# Patient Record
Sex: MALE | Race: WHITE | NOT HISPANIC OR LATINO | Employment: FULL TIME | URBAN - METROPOLITAN AREA
[De-identification: names, ages, dates, MRNs, and addresses within clinical notes are randomized per-mention and may not be internally consistent; named-entity substitution may affect disease eponyms.]

---

## 2017-02-20 ENCOUNTER — HOSPITAL ENCOUNTER (OUTPATIENT)
Dept: ONCOLOGY | Facility: CLINIC | Age: 57
Discharge: HOME | End: 2017-02-20
Attending: INTERNAL MEDICINE | Admitting: INTERNAL MEDICINE

## 2019-03-04 ENCOUNTER — HOSPITAL ENCOUNTER (OUTPATIENT)
Dept: RESPIRATORY THERAPY | Facility: HOSPITAL | Age: 59
Discharge: HOME | End: 2019-03-04
Attending: NURSE PRACTITIONER | Admitting: NURSE PRACTITIONER

## 2020-01-31 ENCOUNTER — APPOINTMENT (OUTPATIENT)
Dept: CT IMAGING | Facility: HOSPITAL | Age: 60
End: 2020-01-31

## 2020-01-31 ENCOUNTER — APPOINTMENT (OUTPATIENT)
Dept: MRI IMAGING | Facility: HOSPITAL | Age: 60
End: 2020-01-31

## 2020-01-31 ENCOUNTER — APPOINTMENT (OUTPATIENT)
Dept: GENERAL RADIOLOGY | Facility: HOSPITAL | Age: 60
End: 2020-01-31

## 2020-01-31 ENCOUNTER — HOSPITAL ENCOUNTER (OUTPATIENT)
Facility: HOSPITAL | Age: 60
Setting detail: OBSERVATION
Discharge: HOME | End: 2020-02-02
Attending: EMERGENCY MEDICINE | Admitting: INTERNAL MEDICINE

## 2020-01-31 DIAGNOSIS — G52.7 MULTIPLE CRANIAL NERVE PALSIES: Primary | ICD-10-CM

## 2020-01-31 LAB
ANION GAP SERPL CALCULATED.3IONS-SCNC: 14 MMOL/L (ref 5–15)
BASOPHILS # BLD AUTO: 0.1 10*3/MM3 (ref 0–0.2)
BASOPHILS NFR BLD AUTO: 0.9 % (ref 0–1.5)
BUN BLD-MCNC: 11 MG/DL (ref 6–20)
BUN/CREAT SERPL: 12.1 (ref 7–25)
CALCIUM SPEC-SCNC: 9.2 MG/DL (ref 8.6–10.5)
CHLORIDE SERPL-SCNC: 99 MMOL/L (ref 98–107)
CO2 SERPL-SCNC: 24 MMOL/L (ref 22–29)
CREAT BLD-MCNC: 0.91 MG/DL (ref 0.76–1.27)
DEPRECATED RDW RBC AUTO: 43.8 FL (ref 37–54)
EOSINOPHIL # BLD AUTO: 0 10*3/MM3 (ref 0–0.4)
EOSINOPHIL NFR BLD AUTO: 0.1 % (ref 0.3–6.2)
ERYTHROCYTE [DISTWIDTH] IN BLOOD BY AUTOMATED COUNT: 13.8 % (ref 12.3–15.4)
ERYTHROCYTE [SEDIMENTATION RATE] IN BLOOD: 27 MM/HR (ref 0–20)
GFR SERPL CREATININE-BSD FRML MDRD: 86 ML/MIN/1.73
GLUCOSE BLD-MCNC: 102 MG/DL (ref 65–99)
GLUCOSE BLDC GLUCOMTR-MCNC: 70 MG/DL (ref 70–105)
GLUCOSE BLDC GLUCOMTR-MCNC: 85 MG/DL (ref 70–105)
HCT VFR BLD AUTO: 43.5 % (ref 37.5–51)
HGB BLD-MCNC: 15.6 G/DL (ref 13–17.7)
HIV1+2 AB SER QL: NORMAL
LYMPHOCYTES # BLD AUTO: 1.1 10*3/MM3 (ref 0.7–3.1)
LYMPHOCYTES NFR BLD AUTO: 15.7 % (ref 19.6–45.3)
MCH RBC QN AUTO: 32.2 PG (ref 26.6–33)
MCHC RBC AUTO-ENTMCNC: 35.8 G/DL (ref 31.5–35.7)
MCV RBC AUTO: 90 FL (ref 79–97)
MONOCYTES # BLD AUTO: 0.7 10*3/MM3 (ref 0.1–0.9)
MONOCYTES NFR BLD AUTO: 10.1 % (ref 5–12)
NEUTROPHILS # BLD AUTO: 5.1 10*3/MM3 (ref 1.7–7)
NEUTROPHILS NFR BLD AUTO: 73.2 % (ref 42.7–76)
NRBC BLD AUTO-RTO: 0 /100 WBC (ref 0–0.2)
PLATELET # BLD AUTO: 203 10*3/MM3 (ref 140–450)
PMV BLD AUTO: 8.7 FL (ref 6–12)
POTASSIUM BLD-SCNC: 4.8 MMOL/L (ref 3.5–5.2)
RBC # BLD AUTO: 4.83 10*6/MM3 (ref 4.14–5.8)
SODIUM BLD-SCNC: 137 MMOL/L (ref 136–145)
TSH SERPL DL<=0.05 MIU/L-ACNC: 2.61 UIU/ML (ref 0.27–4.2)
WBC NRBC COR # BLD: 7 10*3/MM3 (ref 3.4–10.8)

## 2020-01-31 PROCEDURE — 83519 RIA NONANTIBODY: CPT | Performed by: EMERGENCY MEDICINE

## 2020-01-31 PROCEDURE — G0378 HOSPITAL OBSERVATION PER HR: HCPCS

## 2020-01-31 PROCEDURE — 86757 RICKETTSIA ANTIBODY: CPT | Performed by: INTERNAL MEDICINE

## 2020-01-31 PROCEDURE — 99220 PR INITIAL OBSERVATION CARE/DAY 70 MINUTES: CPT | Performed by: INTERNAL MEDICINE

## 2020-01-31 PROCEDURE — G0432 EIA HIV-1/HIV-2 SCREEN: HCPCS | Performed by: INTERNAL MEDICINE

## 2020-01-31 PROCEDURE — 99214 OFFICE O/P EST MOD 30 MIN: CPT | Performed by: PSYCHIATRY & NEUROLOGY

## 2020-01-31 PROCEDURE — 86666 EHRLICHIA ANTIBODY: CPT | Performed by: INTERNAL MEDICINE

## 2020-01-31 PROCEDURE — 85652 RBC SED RATE AUTOMATED: CPT | Performed by: EMERGENCY MEDICINE

## 2020-01-31 PROCEDURE — 85025 COMPLETE CBC W/AUTO DIFF WBC: CPT | Performed by: EMERGENCY MEDICINE

## 2020-01-31 PROCEDURE — 0099U HC BIOFIRE FILMARRAY RESP PANEL 1: CPT | Performed by: INTERNAL MEDICINE

## 2020-01-31 PROCEDURE — 96360 HYDRATION IV INFUSION INIT: CPT

## 2020-01-31 PROCEDURE — 87040 BLOOD CULTURE FOR BACTERIA: CPT | Performed by: INTERNAL MEDICINE

## 2020-01-31 PROCEDURE — 86622 BRUCELLA ANTIBODY: CPT | Performed by: INTERNAL MEDICINE

## 2020-01-31 PROCEDURE — 70551 MRI BRAIN STEM W/O DYE: CPT

## 2020-01-31 PROCEDURE — 86618 LYME DISEASE ANTIBODY: CPT | Performed by: INTERNAL MEDICINE

## 2020-01-31 PROCEDURE — 86592 SYPHILIS TEST NON-TREP QUAL: CPT | Performed by: INTERNAL MEDICINE

## 2020-01-31 PROCEDURE — 71046 X-RAY EXAM CHEST 2 VIEWS: CPT

## 2020-01-31 PROCEDURE — 82607 VITAMIN B-12: CPT | Performed by: INTERNAL MEDICINE

## 2020-01-31 PROCEDURE — 80048 BASIC METABOLIC PNL TOTAL CA: CPT | Performed by: EMERGENCY MEDICINE

## 2020-01-31 PROCEDURE — 0 IOPAMIDOL PER 1 ML: Performed by: EMERGENCY MEDICINE

## 2020-01-31 PROCEDURE — 99283 EMERGENCY DEPT VISIT LOW MDM: CPT

## 2020-01-31 PROCEDURE — 84443 ASSAY THYROID STIM HORMONE: CPT | Performed by: EMERGENCY MEDICINE

## 2020-01-31 PROCEDURE — 82962 GLUCOSE BLOOD TEST: CPT

## 2020-01-31 PROCEDURE — 70496 CT ANGIOGRAPHY HEAD: CPT

## 2020-01-31 RX ORDER — SODIUM CHLORIDE 0.9 % (FLUSH) 0.9 %
10 SYRINGE (ML) INJECTION AS NEEDED
Status: DISCONTINUED | OUTPATIENT
Start: 2020-01-31 | End: 2020-02-02 | Stop reason: HOSPADM

## 2020-01-31 RX ORDER — PYRIDOSTIGMINE BROMIDE 60 MG/1
60 TABLET ORAL EVERY 8 HOURS SCHEDULED
Status: DISCONTINUED | OUTPATIENT
Start: 2020-01-31 | End: 2020-02-02 | Stop reason: HOSPADM

## 2020-01-31 RX ORDER — HYDROXYZINE HYDROCHLORIDE 25 MG/1
25 TABLET, FILM COATED ORAL NIGHTLY PRN
Status: DISCONTINUED | OUTPATIENT
Start: 2020-01-31 | End: 2020-02-02 | Stop reason: HOSPADM

## 2020-01-31 RX ORDER — TRAZODONE HYDROCHLORIDE 50 MG/1
50 TABLET ORAL NIGHTLY
Status: DISCONTINUED | OUTPATIENT
Start: 2020-01-31 | End: 2020-02-02 | Stop reason: HOSPADM

## 2020-01-31 RX ORDER — GABAPENTIN 400 MG/1
400 CAPSULE ORAL NIGHTLY
Status: DISCONTINUED | OUTPATIENT
Start: 2020-01-31 | End: 2020-02-02 | Stop reason: HOSPADM

## 2020-01-31 RX ORDER — GABAPENTIN 100 MG/1
100 CAPSULE ORAL 3 TIMES DAILY
Status: DISCONTINUED | OUTPATIENT
Start: 2020-01-31 | End: 2020-01-31

## 2020-01-31 RX ORDER — SODIUM CHLORIDE 9 MG/ML
125 INJECTION, SOLUTION INTRAVENOUS CONTINUOUS
Status: DISCONTINUED | OUTPATIENT
Start: 2020-01-31 | End: 2020-02-02

## 2020-01-31 RX ORDER — ESCITALOPRAM OXALATE 10 MG/1
20 TABLET ORAL DAILY
Status: DISCONTINUED | OUTPATIENT
Start: 2020-02-01 | End: 2020-02-02 | Stop reason: HOSPADM

## 2020-01-31 RX ORDER — GABAPENTIN 100 MG/1
100 CAPSULE ORAL EVERY MORNING
Status: DISCONTINUED | OUTPATIENT
Start: 2020-02-01 | End: 2020-02-02 | Stop reason: HOSPADM

## 2020-01-31 RX ADMIN — IOPAMIDOL 100 ML: 755 INJECTION, SOLUTION INTRAVENOUS at 18:33

## 2020-01-31 RX ADMIN — HYDROXYZINE HYDROCHLORIDE 25 MG: 25 TABLET, FILM COATED ORAL at 20:02

## 2020-01-31 RX ADMIN — Medication 60 MG: at 20:02

## 2020-01-31 RX ADMIN — SODIUM CHLORIDE 125 ML/HR: 0.9 INJECTION, SOLUTION INTRAVENOUS at 20:04

## 2020-01-31 RX ADMIN — TRAZODONE HYDROCHLORIDE 50 MG: 50 TABLET ORAL at 20:02

## 2020-01-31 RX ADMIN — IOPAMIDOL 100 ML: 755 INJECTION, SOLUTION INTRAVENOUS at 20:09

## 2020-01-31 RX ADMIN — GABAPENTIN 400 MG: 400 CAPSULE ORAL at 20:02

## 2020-02-01 ENCOUNTER — APPOINTMENT (OUTPATIENT)
Dept: CT IMAGING | Facility: HOSPITAL | Age: 60
End: 2020-02-01

## 2020-02-01 LAB
ALBUMIN SERPL-MCNC: 3.6 G/DL (ref 3.5–5.2)
ALBUMIN/GLOB SERPL: 1.1 G/DL
ALP SERPL-CCNC: 71 U/L (ref 39–117)
ALT SERPL W P-5'-P-CCNC: 13 U/L (ref 1–41)
ANION GAP SERPL CALCULATED.3IONS-SCNC: 11 MMOL/L (ref 5–15)
AST SERPL-CCNC: 17 U/L (ref 1–40)
B PERT DNA SPEC QL NAA+PROBE: NOT DETECTED
BASOPHILS # BLD AUTO: 0 10*3/MM3 (ref 0–0.2)
BASOPHILS NFR BLD AUTO: 0.5 % (ref 0–1.5)
BILIRUB SERPL-MCNC: 0.3 MG/DL (ref 0.2–1.2)
BILIRUB UR QL STRIP: NEGATIVE
BUN BLD-MCNC: 12 MG/DL (ref 6–20)
BUN/CREAT SERPL: 13.8 (ref 7–25)
C PNEUM DNA NPH QL NAA+NON-PROBE: NOT DETECTED
CALCIUM SPEC-SCNC: 8.7 MG/DL (ref 8.6–10.5)
CHLORIDE SERPL-SCNC: 100 MMOL/L (ref 98–107)
CLARITY UR: CLEAR
CO2 SERPL-SCNC: 25 MMOL/L (ref 22–29)
COLOR UR: YELLOW
CREAT BLD-MCNC: 0.87 MG/DL (ref 0.76–1.27)
DEPRECATED RDW RBC AUTO: 42.9 FL (ref 37–54)
EOSINOPHIL # BLD AUTO: 0 10*3/MM3 (ref 0–0.4)
EOSINOPHIL NFR BLD AUTO: 0.8 % (ref 0.3–6.2)
ERYTHROCYTE [DISTWIDTH] IN BLOOD BY AUTOMATED COUNT: 13.8 % (ref 12.3–15.4)
ERYTHROCYTE [SEDIMENTATION RATE] IN BLOOD: 28 MM/HR (ref 0–20)
FLUAV H1 2009 PAND RNA NPH QL NAA+PROBE: DETECTED
FLUAV H1 HA GENE NPH QL NAA+PROBE: NOT DETECTED
FLUAV H3 RNA NPH QL NAA+PROBE: NOT DETECTED
FLUAV SUBTYP SPEC NAA+PROBE: NOT DETECTED
FLUBV RNA ISLT QL NAA+PROBE: NOT DETECTED
GFR SERPL CREATININE-BSD FRML MDRD: 90 ML/MIN/1.73
GLOBULIN UR ELPH-MCNC: 3.3 GM/DL
GLUCOSE BLD-MCNC: 98 MG/DL (ref 65–99)
GLUCOSE BLDC GLUCOMTR-MCNC: 110 MG/DL (ref 70–105)
GLUCOSE UR STRIP-MCNC: NEGATIVE MG/DL
HADV DNA SPEC NAA+PROBE: NOT DETECTED
HCOV 229E RNA SPEC QL NAA+PROBE: NOT DETECTED
HCOV HKU1 RNA SPEC QL NAA+PROBE: NOT DETECTED
HCOV NL63 RNA SPEC QL NAA+PROBE: NOT DETECTED
HCOV OC43 RNA SPEC QL NAA+PROBE: NOT DETECTED
HCT VFR BLD AUTO: 42.4 % (ref 37.5–51)
HGB BLD-MCNC: 14.3 G/DL (ref 13–17.7)
HGB UR QL STRIP.AUTO: NEGATIVE
HMPV RNA NPH QL NAA+NON-PROBE: NOT DETECTED
HPIV1 RNA SPEC QL NAA+PROBE: NOT DETECTED
HPIV2 RNA SPEC QL NAA+PROBE: NOT DETECTED
HPIV3 RNA NPH QL NAA+PROBE: NOT DETECTED
HPIV4 P GENE NPH QL NAA+PROBE: NOT DETECTED
KETONES UR QL STRIP: ABNORMAL
LEUKOCYTE ESTERASE UR QL STRIP.AUTO: NEGATIVE
LYMPHOCYTES # BLD AUTO: 2.2 10*3/MM3 (ref 0.7–3.1)
LYMPHOCYTES NFR BLD AUTO: 39.7 % (ref 19.6–45.3)
M PNEUMO IGG SER IA-ACNC: NOT DETECTED
MCH RBC QN AUTO: 30.2 PG (ref 26.6–33)
MCHC RBC AUTO-ENTMCNC: 33.7 G/DL (ref 31.5–35.7)
MCV RBC AUTO: 89.6 FL (ref 79–97)
MONOCYTES # BLD AUTO: 0.7 10*3/MM3 (ref 0.1–0.9)
MONOCYTES NFR BLD AUTO: 12.4 % (ref 5–12)
NEUTROPHILS # BLD AUTO: 2.6 10*3/MM3 (ref 1.7–7)
NEUTROPHILS NFR BLD AUTO: 46.6 % (ref 42.7–76)
NITRITE UR QL STRIP: NEGATIVE
NRBC BLD AUTO-RTO: 0.3 /100 WBC (ref 0–0.2)
PH UR STRIP.AUTO: 6 [PH] (ref 5–8)
PLATELET # BLD AUTO: 204 10*3/MM3 (ref 140–450)
PMV BLD AUTO: 8.6 FL (ref 6–12)
POTASSIUM BLD-SCNC: 4 MMOL/L (ref 3.5–5.2)
PROT SERPL-MCNC: 6.9 G/DL (ref 6–8.5)
PROT UR QL STRIP: NEGATIVE
RBC # BLD AUTO: 4.74 10*6/MM3 (ref 4.14–5.8)
RHINOVIRUS RNA SPEC NAA+PROBE: NOT DETECTED
RPR SER QL: NORMAL
RSV RNA NPH QL NAA+NON-PROBE: NOT DETECTED
SODIUM BLD-SCNC: 136 MMOL/L (ref 136–145)
SP GR UR STRIP: 1.03 (ref 1–1.03)
UROBILINOGEN UR QL STRIP: ABNORMAL
VIT B12 BLD-MCNC: 579 PG/ML (ref 211–946)
WBC NRBC COR # BLD: 5.6 10*3/MM3 (ref 3.4–10.8)

## 2020-02-01 PROCEDURE — 80053 COMPREHEN METABOLIC PANEL: CPT | Performed by: INTERNAL MEDICINE

## 2020-02-01 PROCEDURE — 96361 HYDRATE IV INFUSION ADD-ON: CPT

## 2020-02-01 PROCEDURE — 99213 OFFICE O/P EST LOW 20 MIN: CPT | Performed by: PSYCHIATRY & NEUROLOGY

## 2020-02-01 PROCEDURE — G0378 HOSPITAL OBSERVATION PER HR: HCPCS

## 2020-02-01 PROCEDURE — 99225 PR SBSQ OBSERVATION CARE/DAY 25 MINUTES: CPT | Performed by: INTERNAL MEDICINE

## 2020-02-01 PROCEDURE — 85025 COMPLETE CBC W/AUTO DIFF WBC: CPT | Performed by: INTERNAL MEDICINE

## 2020-02-01 PROCEDURE — 81003 URINALYSIS AUTO W/O SCOPE: CPT | Performed by: INTERNAL MEDICINE

## 2020-02-01 PROCEDURE — 0 IOPAMIDOL PER 1 ML: Performed by: INTERNAL MEDICINE

## 2020-02-01 PROCEDURE — 82962 GLUCOSE BLOOD TEST: CPT

## 2020-02-01 PROCEDURE — 85652 RBC SED RATE AUTOMATED: CPT | Performed by: INTERNAL MEDICINE

## 2020-02-01 PROCEDURE — 70491 CT SOFT TISSUE NECK W/DYE: CPT

## 2020-02-01 RX ORDER — PYRIDOSTIGMINE BROMIDE 60 MG/1
60 TABLET ORAL EVERY 8 HOURS SCHEDULED
Qty: 90 TABLET | Refills: 0 | Status: SHIPPED | OUTPATIENT
Start: 2020-02-01 | End: 2020-03-02

## 2020-02-01 RX ADMIN — TRAZODONE HYDROCHLORIDE 50 MG: 50 TABLET ORAL at 21:16

## 2020-02-01 RX ADMIN — Medication 10 ML: at 21:17

## 2020-02-01 RX ADMIN — Medication 60 MG: at 06:20

## 2020-02-01 RX ADMIN — GABAPENTIN 400 MG: 400 CAPSULE ORAL at 21:17

## 2020-02-01 RX ADMIN — ESCITALOPRAM OXALATE 20 MG: 10 TABLET ORAL at 10:23

## 2020-02-01 RX ADMIN — Medication 60 MG: at 15:35

## 2020-02-01 RX ADMIN — Medication 60 MG: at 21:16

## 2020-02-01 RX ADMIN — IOPAMIDOL 100 ML: 755 INJECTION, SOLUTION INTRAVENOUS at 18:45

## 2020-02-01 RX ADMIN — GABAPENTIN 100 MG: 100 CAPSULE ORAL at 06:20

## 2020-02-01 NOTE — PLAN OF CARE
Patient vitals stable throughout shift. Per Dr Meléndez patient is ok to discharge as long as CT scan tonight is normal. Patient not able to go to CT until after 1830 due to amount of contrast received. Nurse will continue to monitor.

## 2020-02-01 NOTE — PROGRESS NOTES
Cc:   Left ptosis.        S:   The patient is doing somewhat better.      O: Vitals stable.   CT  Angiogram of  Head was unremarkable.   MRI of Brain was unremarkable..  O/E The patient is lying in bed in no apparent distress.  Awake, alert, oriented x 3.  Speech is good.  CN left ptosis is present. Motor  5/5.      A:  Left eye  Ptosis.  On  Mestinon.  Some improvement noted.      P:   Will continue  Mestinon at  Present.  Will need to see an ophthalmologist as out patient basis.

## 2020-02-01 NOTE — DISCHARGE SUMMARY
[facility] Medicine Services  DISCHARGE SUMMARY        Prepared For PCP:  Monica Mason APRN    Patient Name: Jh Bonds  : 1960  MRN: [id number]      Date of Admission:   2020    Date of Discharge:  2020    Length of stay:  LOS: 0 days     Hospital Course     Presenting Problem:   Multiple cranial nerve palsies [G52.7]      Active Hospital Problems    Diagnosis  POA   • Multiple cranial nerve palsies [G52.7]  Yes      Resolved Hospital Problems   No resolved problems to display.           Hospital Course:  Jh Bonds is a 59 y.o. 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  Seen by Dr. Johnson, neurologist and MRI brain and CT angiogram of the head were negative.  Patient will have CT of the chest with contrast and CT of the neck to rule out other etiologies for possible Chago syndrome.  Patient had trial with Mestinon and recommend to continue per neurology.  Recommendation.  Patient was to follow-up with ophthalmologist soon as possible.  Try to arrange with ophthalmology follow-up today otherwise probably Monday.  Patient was so avoids driving or operating machinery.  Patient is an .  Patient denies any exposure to lead containing materials.  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  Patient advised to follow-up with neurology 2 to 4 weeks as well.  Patient had multiple tests that are still pending today take panel  He had evidence of influenza A but has had symptoms for about 6 to 7 days and not candidate for treatment at this time.  Patient aware of the results.  Blood cultures currently pending but so far negative.  HIV screen is negative  CT soft tissue of the neck and upper chest shows left upper lobe spiculated lesion that needs evaluation by pulmonary.  Patient may be discharged after pulmonary evaluation  Dr. newby recommended outpatient evaluation  Patient  has history of DVT and PE in the past  Due to concern for need for anticoagulation heme-onc consulted regarding need for long term anticoagulation as this has been told to the patient in the past.    Recommendation for Outpatient Providers:     Follow-up on the final results of tick panel.  Currently pending results of CT chest and neck with contrast.  Patient advised to come to the ER if worsening symptoms or new symptoms arise          Reasons For Change In Medications and Indications for New Medications:        Day of Discharge     HPI:       Vital Signs:   Temp:  [97.6 °F (36.4 °C)-98.8 °F (37.1 °C)] 98.3 °F (36.8 °C)  Heart Rate:  [65-83] 77  Resp:  [11-21] 11  BP: (101-132)/(62-89) 101/71     Physical Exam:  Physical Exam   Constitutional: He is oriented to person, place, and time. He appears well-developed and well-nourished. No distress.   HENT:   Head: Normocephalic and atraumatic.   Right Ear: External ear normal.   Left Ear: External ear normal.   Nose: Nose normal.   Mouth/Throat: Oropharynx is clear and moist. No oropharyngeal exudate.   Eyes: Pupils are equal, round, and reactive to light. Conjunctivae and EOM are normal. Right eye exhibits no discharge. Left eye exhibits no discharge. No scleral icterus.   Neck: Normal range of motion. No JVD present. No tracheal deviation present. No thyromegaly present.   Cardiovascular: Normal rate, regular rhythm, normal heart sounds and intact distal pulses. Exam reveals no gallop and no friction rub.   No murmur heard.  Pulmonary/Chest: Effort normal and breath sounds normal. No stridor. No respiratory distress. He has no wheezes. He has no rales. He exhibits no tenderness.   Abdominal: Soft. Bowel sounds are normal. He exhibits no distension and no mass. There is no tenderness. There is no rebound and no guarding. No hernia.   Musculoskeletal: Normal range of motion. He exhibits no edema, tenderness or deformity.   Lymphadenopathy:     He has no cervical  adenopathy.   Neurological: He is alert and oriented to person, place, and time. No cranial nerve deficit or sensory deficit. He exhibits normal muscle tone. Coordination normal.   Left 3rd cranial nerve palsy with left eye droop sparing pupil.  No other neurologic deficits or lateralizing features.   Skin: Skin is warm and dry. No rash noted. He is not diaphoretic. No erythema.   Psychiatric: He has a normal mood and affect. His behavior is normal.   Nursing note and vitals reviewed.      Pertinent  and/or Most Recent Results     Results from last 7 days   Lab Units 02/01/20  0243 01/31/20  1443   WBC 10*3/mm3 5.60 7.00   HEMOGLOBIN g/dL 14.3 15.6   HEMATOCRIT % 42.4 43.5   PLATELETS 10*3/mm3 204 203   SODIUM mmol/L 136 137   POTASSIUM mmol/L 4.0 4.8   CHLORIDE mmol/L 100 99   CO2 mmol/L 25.0 24.0   BUN mg/dL 12 11   CREATININE mg/dL 0.87 0.91   GLUCOSE mg/dL 98 102*   CALCIUM mg/dL 8.7 9.2     Results from last 7 days   Lab Units 02/01/20  0243   BILIRUBIN mg/dL 0.3   ALK PHOS U/L 71   ALT (SGPT) U/L 13   AST (SGOT) U/L 17           Invalid input(s): TG, LDLCALC, LDLREALC  Results from last 7 days   Lab Units 01/31/20  1443   TSH uIU/mL 2.610       Brief Urine Lab Results  (Last result in the past 365 days)      Color   Clarity   Blood   Leuk Est   Nitrite   Protein   CREAT   Urine HCG        02/01/20 0753 Yellow Clear Negative Negative Negative Negative               Microbiology Results Abnormal     Procedure Component Value - Date/Time    Blood Culture - Blood, Arm, Left [[id number]] Collected:  01/31/20 1807    Lab Status:  Preliminary result Specimen:  Blood from Arm, Left Updated:  02/01/20 0615     Blood Culture No growth at less than 24 hours    Respiratory Panel, PCR - Swab, Nasopharynx [[id number]]  (Abnormal) Collected:  01/31/20 2317    Lab Status:  Final result Specimen:  Swab from Nasopharynx Updated:  02/01/20 0206     ADENOVIRUS, PCR Not Detected     Coronavirus 229E Not Detected     Coronavirus HKU1  Not Detected     Coronavirus NL63 Not Detected     Coronavirus OC43 Not Detected     Human Metapneumovirus Not Detected     Human Rhinovirus/Enterovirus Not Detected     Influenza B PCR Not Detected     Parainfluenza Virus 1 Not Detected     Parainfluenza Virus 2 Not Detected     Parainfluenza Virus 3 Not Detected     Parainfluenza Virus 4 Not Detected     Bordetella pertussis pcr Not Detected     Influenza A H1 2009 PCR Detected     Chlamydophila pneumoniae PCR Not Detected     Mycoplasma pneumo by PCR Not Detected     Influenza A PCR Not Detected     Influenza A H3 Not Detected     Influenza A H1 Not Detected     RSV, PCR Not Detected          Xr Chest 2 View    Result Date: 1/31/2020  Impression: No acute cardiopulmonary process.  Electronically Signed By-Venus Sellers On:1/31/2020 3:04 PM This report was finalized on [id number] by  Venus Sellers, .    Mri Brain Without Contrast    Result Date: 1/31/2020  Impression: No acute intracranial findings. No evidence of acute or subacute stroke. Mild chronic microvascular disease changes.   Electronically Signed By-Dr. Dona Gore MD On:1/31/2020 3:38 PM This report was finalized on [id number] by Dr. Dona Gore MD.    Ct Angiogram Head    Result Date: 1/31/2020  Impression:  1. No cerebral aneurysm or arteriovenous malformation. 2. Normal intracranial arterial anatomy. 3. Mild chronic ethmoid sinus disease.  Electronically Signed By-Sid Girard On:1/31/2020 7:57 PM This report was finalized on [id number] by  Sid Girard, .                             Test Results Pending at Discharge   Order Current Status    Acetylcholine Receptor Antibody Panel In process    E. Chaffeenis-HME (Monocytic) In process    Febrile Antibody Profile In process    Human Granulocytic Nancy-HGE In process    Lyme Antibody IgG In process    RPR In process    Tick Panel - Blood, Blood, Central Line In process    Blood Culture - Blood, Arm, Left Preliminary result             Procedures Performed           Consults:   Consults     Date and Time Order Name Status Description    1/31/2020 1630 Hospitalist (on-call MD unless specified) Completed     1/31/2020 1424 Inpatient Neurology Consult General Completed             Discharge Details        Discharge Medications      New Medications      Instructions Start Date   pyridostigmine 60 MG tablet  Commonly known as:  MESTINON   60 mg, Oral, Every 8 Hours Scheduled         Continue These Medications      Instructions Start Date   disulfiram 500 MG tablet tablet  Commonly known as:  ANTABUSE   500 mg, Oral, Daily      escitalopram 20 MG tablet  Commonly known as:  LEXAPRO   20 mg, Oral, Daily      gabapentin 100 MG capsule  Commonly known as:  NEURONTIN   100 mg, Oral, Every Morning      gabapentin 400 MG capsule  Commonly known as:  NEURONTIN   400 mg, Oral, Every Night at Bedtime      hydrOXYzine 25 MG tablet  Commonly known as:  ATARAX   25 mg, Oral, Every Morning      traZODone 50 MG tablet  Commonly known as:  DESYREL   50 mg, Oral, Nightly             No Known Allergies      Discharge Disposition:  Home or Self Care    Diet:  Hospital:  Diet Order   Procedures   • Diet Regular         Discharge Activity:         CODE STATUS:    Code Status and Medical Interventions:   Ordered at: 01/31/20 1702     Level Of Support Discussed With:    Patient     Code Status:    CPR     Medical Interventions (Level of Support Prior to Arrest):    Full         Follow-up Appointments  No future appointments.    Additional Instructions for the Follow-ups that You Need to Schedule     Discharge Follow-up with PCP   As directed       Currently Documented PCP:    Monica Mason APRN    PCP Phone Number:    [phone]     Follow Up Details:  1 week         Discharge Follow-up with Specialty: Neurology and ophthalmologyAs instructed   As directed      Specialty:  Neurology and ophthalmologyAs instructed                 Condition on Discharge:       Stable          Electronically signed by Kermit Meléndez MD, 02/01/20, 10:49 AM.    Time: I spent 66  minutes on this discharge activity which included face-to-face encounter with the patient/reviewing the data in the system/coordination of the care with the nursing staff as well as consultants/documentation/entering orders.

## 2020-02-02 VITALS
RESPIRATION RATE: 16 BRPM | DIASTOLIC BLOOD PRESSURE: 72 MMHG | HEART RATE: 84 BPM | SYSTOLIC BLOOD PRESSURE: 108 MMHG | TEMPERATURE: 97.8 F | BODY MASS INDEX: 32.08 KG/M2 | OXYGEN SATURATION: 96 % | HEIGHT: 74 IN | WEIGHT: 250 LBS

## 2020-02-02 PROCEDURE — 99217 PR OBSERVATION CARE DISCHARGE MANAGEMENT: CPT | Performed by: INTERNAL MEDICINE

## 2020-02-02 PROCEDURE — G0378 HOSPITAL OBSERVATION PER HR: HCPCS

## 2020-02-02 PROCEDURE — 99213 OFFICE O/P EST LOW 20 MIN: CPT | Performed by: PSYCHIATRY & NEUROLOGY

## 2020-02-02 RX ADMIN — Medication 60 MG: at 05:43

## 2020-02-02 RX ADMIN — ESCITALOPRAM OXALATE 20 MG: 10 TABLET ORAL at 09:40

## 2020-02-02 RX ADMIN — GABAPENTIN 100 MG: 100 CAPSULE ORAL at 05:43

## 2020-02-02 RX ADMIN — Medication 10 ML: at 09:40

## 2020-02-02 RX ADMIN — Medication 60 MG: at 14:57

## 2020-02-02 NOTE — CONSULTS
[facility]: [facility]         CONSULT NOTE    Patient Identification:  Jh Bonds  59 y.o.  male  1960  [id number]            Requesting physician: Attending physician    Reason for Consultation: Left upper lobe nodule      History of Present Illness:  59 y.o.  presents to [facility] complaining of acute onset of left eyelid.  Started yesterday in the morning.  He had MRI of the brain that was negative for acute abnormality.  Also has low-grade fever and not feeling well for about a week with cough without hemoptysis or active expectoration.  He denies any use of drugs.  He used to drink alcohol in the past but stopped 9 months ago.  He denies any new medications.  He denies any autoimmune problems in the past.  He denies any vaccination recently.  He did not get his flu vaccine this year.  He had denied also any skin rash or other neurologic symptoms including upper or lower extremities or diarrhea or motor or sensory symptoms except for right posterior leg tingling.  He also has chronic tingling in his left pinky.  He had no improvement since yesterday and decided to go to the ER for evaluation.  Reports pneumonia and DVT and PE 2017 but not on any anticoagulants now.    Assessment:  9 mm left upper lobe nodule very unlikely the cause of ptosis  Very small hilar adenopathy 10 to 11 mm  Quit smoking 2 years ago currently he is vaping    History of DVT and PE patient was told he has hypercoagulability state but he was not able to afford anticoagulation medication, when his insurance changed the cost was $400 per month and he stopped using it    Recommendations:  Since the lung nodule is very small only 9 mm we will watch it with repeat CT PET scan after 3 months as an outpatient                    Review of Sytems:  Review of Systems   HENT: Positive for congestion. Negative for rhinorrhea and sneezing.    Respiratory: Negative for apnea, cough, choking, chest tightness, shortness of  "breath, wheezing and stridor.    Cardiovascular: Negative for leg swelling.   Neurological: Positive for facial asymmetry.       Past Medical History:  Past Medical History:   Diagnosis Date   • Alcohol abuse     sober x 9 months   • Anxiety    • Depression        Past Surgical History:  Past Surgical History:   Procedure Laterality Date   • BRONCHOSCOPY          Home Meds:  Medications Prior to Admission   Medication Sig Dispense Refill Last Dose   • disulfiram (ANTABUSE) 500 MG tablet tablet Take 500 mg by mouth Daily.      • escitalopram (LEXAPRO) 20 MG tablet Take 20 mg by mouth Daily.      • gabapentin (NEURONTIN) 100 MG capsule Take 100 mg by mouth Every Morning.      • gabapentin (NEURONTIN) 400 MG capsule Take 400 mg by mouth every night at bedtime.      • hydrOXYzine (ATARAX) 25 MG tablet Take 25 mg by mouth Every Morning.      • traZODone (DESYREL) 50 MG tablet Take 50 mg by mouth Every Night.          Allergies:  No Known Allergies    Social History:   Social History     Socioeconomic History   • Marital status:      Spouse name: Not on file   • Number of children: Not on file   • Years of education: Not on file   • Highest education level: Not on file   Tobacco Use   • Smoking status: Former Smoker     Last attempt to quit: 2018     Years since quittin.0   • Smokeless tobacco: Current User   • Tobacco comment: vapes daily   Substance and Sexual Activity   • Alcohol use: Not Currently     Comment: 9 months sober   • Drug use: Never   • Sexual activity: Defer       Family History:  No family history on file.    Physical Exam:  /72 (BP Location: Right arm, Patient Position: Lying)   Pulse 84   Temp 97.8 °F (36.6 °C) (Oral)   Resp 16   Ht 188 cm (74\")   Wt 113 kg (250 lb)   SpO2 96%   BMI 32.10 kg/m²  Body mass index is 32.1 kg/m². 96% 113 kg (250 lb)  Physical Exam   Cardiovascular:   No murmur heard.  Pulmonary/Chest: No respiratory distress. He has no wheezes. He has no rales. " He exhibits no tenderness.   Abdominal: He exhibits no distension. There is no tenderness.   Musculoskeletal: He exhibits edema.   Neurological: A cranial nerve deficit is present.       LABS:  Lab Results   Component Value Date    CALCIUM 8.7 02/01/2020     Results from last 7 days   Lab Units 02/01/20  0243 01/31/20  1443   SODIUM mmol/L 136 137   POTASSIUM mmol/L 4.0 4.8   CHLORIDE mmol/L 100 99   CO2 mmol/L 25.0 24.0   BUN mg/dL 12 11   CREATININE mg/dL 0.87 0.91   GLUCOSE mg/dL 98 102*   CALCIUM mg/dL 8.7 9.2   WBC 10*3/mm3 5.60 7.00   HEMOGLOBIN g/dL 14.3 15.6   PLATELETS 10*3/mm3 204 203   ALT (SGPT) U/L 13  --    AST (SGOT) U/L 17  --      No results found for: CKTOTAL, CKMB, CKMBINDEX, TROPONINI, TROPONINT      Results from last 7 days   Lab Units 01/31/20  1807   BLOODCX  No growth at 24 hours             Results from last 7 days   Lab Units 01/31/20  2317   ADENOVIRUS DETECTION BY PCR  Not Detected   CORONAVIRUS 229E  Not Detected   CORONAVIRUS HKU1  Not Detected   CORONAVIRUS NL63  Not Detected   CORONAVIRUS OC43  Not Detected   HUMAN METAPNEUMOVIRUS  Not Detected   HUMAN RHINOVIRUS/ENTEROVIRUS  Not Detected   INFLUENZA B PCR  Not Detected   PARAINFLUENZA 1  Not Detected   PARAINFLUENZA VIRUS 2  Not Detected   PARAINFLUENZA VIRUS 3  Not Detected   PARAINFLUENZA VIRUS 4  Not Detected   BORDETELLA PERTUSSIS PCR  Not Detected   CHLAMYDOPHILA PNEUMONIAE PCR  Not Detected   MYCOPLAMA PNEUMO PCR  Not Detected   INFLUENZA A PCR  Not Detected   INFLUENZA A H3  Not Detected   INFLUENZA A H1  Not Detected   RSV, PCR  Not Detected         Results from last 7 days   Lab Units 01/31/20  1807   BLOODCX  No growth at 24 hours     Lab Results   Component Value Date    TSH 2.610 01/31/2020     Estimated Creatinine Clearance: 125.2 mL/min (by C-G formula based on SCr of 0.87 mg/dL).  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     Comparison: Chest CT 11/20/2016.     Technique: Axial contrast enhanced volumetric CT imaging of the neck was  performed utilizing 100 mL Isovue 370  IV contrast. Automated exposure  control and iterative reconstruction methods were used.     Findings:  There is no evidence of a neck mass or lymphadenopathy.     The salivary and thyroid glands appear symmetric without focal mass  lesion or definite inflammation.     The parapharyngeal, pharyngeal mucosal, carotid, parotid, ,  buccal, prevertebral and retropharyngeal suprahyoid spaces of the neck  appear within normal limits.  The infrahyoid neck structures appear  within normal limits.  The epiglottis, aryepiglottic folds, false vocal  cords, vocal ligaments and subglottic airway appear within normal  limits.  The hyoid bone, thyroid and cricoid cartilages appear within  normal limits.  The visualized trachea and esophagus are unremarkable.     The oral cavity, tongue, base of tongue and floor of mouth appear  unremarkable.     There is a 9 mm spiculated nodule within the medial anterior left lung  apex on axial image 87. This represents a change from the prior study.  There is mild mediastinal adenopathy with AP window lymph nodes  measuring 11 mm and 10 mm in the short axis both on axial image 105.  There is a mildly enlarged right hilar lymph node partially imaged  measuring 11 mm in the short axis. There is a mildly enlarged  prevascular lymph node on axial image 89 measuring 10 mm in the short  axis. These appear slightly larger than before. Limited view of the  intracranial contents is within normal limits.  There is normal  contrast  opacification of the neck vasculature.  The orbits appear unremarkable.   There is mild inferior right frontal, right ethmoid and bilateral  sphenoid sinus mucosal disease. There is mild bilateral maxillary sinus  mucosal disease. The mastoid air cells appear well aerated.There are no  acute osseous abnormality is or destructive bone lesions. There is mild  C6-7 disc and uncovertebral joint degeneration without significant  neural foraminal or spinal canal stenosis.          Impression:          1. 9 mm spiculated nodule in the left upper lobe. There is also mild  mediastinal lymphadenopathy. PET/CT should be considered to evaluate for  malignant potential.  2. Mild C6-C7 disc and uncovertebral joint degeneration.  7. Mild paranasal sinus mucosal disease.     Electronically Signed By-Nghia Damian On:2/1/2020 7:38 PM  This report was finalized on [id number] by  Nghia Damian, .            Current Meds:   SCHEDULE    escitalopram 20 mg Oral Daily   gabapentin 100 mg Oral QAM   gabapentin 400 mg Oral Nightly   pyridostigmine 60 mg Oral Q8H   traZODone 50 mg Oral Nightly     Infusions     PRNs  hydrOXYzine  •  [COMPLETED] Insert peripheral IV **AND** sodium chloride        Bonnie Wright MD  2/2/2020  2:43 PM      Much of this encounter note is an electronic transcription/translation of spoken language to printed text using Dragon Software.

## 2020-02-02 NOTE — PROGRESS NOTES
"      [facility] Daily Progress Note      Hospitalist Team  LOS 0 days      Patient Care Team:  Monica Mason APRN as PCP - General (Nurse Practitioner)    Patient Location: 252/1      Subjective   Subjective     Chief Complaint / Subjective  Chief Complaint   Patient presents with   • Eye Problem         Brief Synopsis of Hospital Course/HPI  Patient admitted for left 3rd nerve palsy.  He has history of depression and anxiety and alcohol dependence  Patient had negative MRI of the brain and CTA of the head.  CT of the soft tissue of the neck and upper chest for evaluation of possible Chago syndrome showed spiculated lesion in the left upper lobe.  Pulmonary consulted          Date::      2/2/2020  Patient sees no significant improvement his left lid droop  Patient currently on Mestinon.  Neurology recommendation      ROS  All other system reviewed and negative    Objective   Objective      Vital Signs  Temp:  [97.8 °F (36.6 °C)-98.9 °F (37.2 °C)] 97.8 °F (36.6 °C)  Heart Rate:  [72-84] 84  Resp:  [13-16] 16  BP: ()/(54-76) 108/72  Oxygen Therapy  SpO2: 96 %  Pulse Oximetry Type: Intermittent  Device (Oxygen Therapy): room air  Flowsheet Rows      First Filed Value   Admission Height  188 cm (74\") Documented at 01/31/2020 1355   Admission Weight  112 kg (245 lb 13 oz) Documented at 01/31/2020 1355        Intake & Output (last 3 days)       01/30 0701 - 01/31 0700 01/31 0701 - 02/01 0700 02/01 0701 - 02/02 0700 02/02 0701 - 02/03 0700    P.O.   240 240    Total Intake(mL/kg)   240 (2.1) 240 (2.1)    Net   +240 +240            Urine Unmeasured Occurrence    3 x        Lines, Drains & Airways    Active LDAs     Name:   Placement date:   Placement time:   Site:   Days:    Peripheral IV 01/31/20 1442 Left Antecubital   01/31/20    1442    Antecubital   1                  Physical Exam:    Physical Exam   Constitutional: He is oriented to person, place, and time. He appears " well-developed and well-nourished. No distress.   HENT:   Head: Normocephalic and atraumatic.       Right Ear: External ear normal.   Left Ear: External ear normal.   Nose: Nose normal.   Mouth/Throat: Oropharynx is clear and moist. No oropharyngeal exudate.   Eyes: Pupils are equal, round, and reactive to light. Conjunctivae and EOM are normal. Right eye exhibits no discharge. Left eye exhibits no discharge. No scleral icterus.   Neck: Normal range of motion. No JVD present. No tracheal deviation present. No thyromegaly present.   Cardiovascular: Normal rate, regular rhythm, normal heart sounds and intact distal pulses. Exam reveals no gallop and no friction rub.   No murmur heard.  Pulmonary/Chest: Effort normal and breath sounds normal. No stridor. No respiratory distress. He has no wheezes. He has no rales. He exhibits no tenderness.   Abdominal: Soft. Bowel sounds are normal. He exhibits no distension and no mass. There is no tenderness. There is no rebound and no guarding. No hernia.   Musculoskeletal: Normal range of motion. He exhibits no edema, tenderness or deformity.   Lymphadenopathy:     He has no cervical adenopathy.   Neurological: He is alert and oriented to person, place, and time. No cranial nerve deficit or sensory deficit. He exhibits normal muscle tone. Coordination normal.   Skin: Skin is warm and dry. No rash noted. He is not diaphoretic. No erythema.   Psychiatric: He has a normal mood and affect. His behavior is normal.   Nursing note and vitals reviewed.            Procedures:              Results Review:     I reviewed the patient's new clinical results.      Lab Results (last 24 hours)     Procedure Component Value Units Date/Time    RPR [[id number]]  (Normal) Collected:  01/31/20 1807    Specimen:  Blood Updated:  02/01/20 1903     RPR Non-Reactive    Blood Culture - Blood, Arm, Left [[id number]] Collected:  01/31/20 1807    Specimen:  Blood from Arm, Left Updated:  02/01/20 1815     Blood  Culture No growth at 24 hours        No results found for: HGBA1C            No results found for: LIPASE  No results found for: CHOL, CHLPL, TRIG, HDL, LDL, LDLDIRECT    No results found for: INTRAOP, PREDX, FINALDX, COMDX    Microbiology Results (last 10 days)     Procedure Component Value - Date/Time    Respiratory Panel, PCR - Swab, Nasopharynx [[id number]]  (Abnormal) Collected:  01/31/20 2317    Lab Status:  Final result Specimen:  Swab from Nasopharynx Updated:  02/01/20 0206     ADENOVIRUS, PCR Not Detected     Coronavirus 229E Not Detected     Coronavirus HKU1 Not Detected     Coronavirus NL63 Not Detected     Coronavirus OC43 Not Detected     Human Metapneumovirus Not Detected     Human Rhinovirus/Enterovirus Not Detected     Influenza B PCR Not Detected     Parainfluenza Virus 1 Not Detected     Parainfluenza Virus 2 Not Detected     Parainfluenza Virus 3 Not Detected     Parainfluenza Virus 4 Not Detected     Bordetella pertussis pcr Not Detected     Influenza A H1 2009 PCR Detected     Chlamydophila pneumoniae PCR Not Detected     Mycoplasma pneumo by PCR Not Detected     Influenza A PCR Not Detected     Influenza A H3 Not Detected     Influenza A H1 Not Detected     RSV, PCR Not Detected    Blood Culture - Blood, Arm, Left [[id number]] Collected:  01/31/20 1807    Lab Status:  Preliminary result Specimen:  Blood from Arm, Left Updated:  02/01/20 1815     Blood Culture No growth at 24 hours          ECG/EMG Results (most recent)     None                    Xr Chest 2 View    Result Date: 1/31/2020  No acute cardiopulmonary process.  Electronically Signed By-Venus Sellers On:1/31/2020 3:04 PM This report was finalized on [id number] by  Venus Sellers, .    Ct Soft Tissue Neck With Contrast    Result Date: 2/1/2020   1. 9 mm spiculated nodule in the left upper lobe. There is also mild mediastinal lymphadenopathy. PET/CT should be considered to evaluate for malignant potential. 2. Mild C6-C7 disc and  uncovertebral joint degeneration. 7. Mild paranasal sinus mucosal disease.  Electronically Signed By-Nghia Damian On:2/1/2020 7:38 PM This report was finalized on [id number] by  Nghia Damian, .    Mri Brain Without Contrast    Result Date: 1/31/2020  No acute intracranial findings. No evidence of acute or subacute stroke. Mild chronic microvascular disease changes.   Electronically Signed By-Dr. Dona Gore MD On:1/31/2020 3:38 PM This report was finalized on [id number] by Dr. Dona Gore MD.    Ct Angiogram Head    Result Date: 1/31/2020   1. No cerebral aneurysm or arteriovenous malformation. 2. Normal intracranial arterial anatomy. 3. Mild chronic ethmoid sinus disease.  Electronically Signed By-Sid Girard On:1/31/2020 7:57 PM This report was finalized on [id number] by  Sid Girard, .          Xrays, labs reviewed personally by physician.    Medication Review:   I have reviewed the patient's current medication list      Scheduled Meds    escitalopram 20 mg Oral Daily   gabapentin 100 mg Oral QAM   gabapentin 400 mg Oral Nightly   pyridostigmine 60 mg Oral Q8H   traZODone 50 mg Oral Nightly       Meds Infusions    sodium chloride 125 mL/hr Last Rate: Stopped (02/01/20 1536)       Meds PRN  hydrOXYzine  •  [COMPLETED] Insert peripheral IV **AND** sodium chloride      Assessment/Plan   Assessment/Plan     Active Hospital Problems    Diagnosis  POA   • Multiple cranial nerve palsies [G52.7]  Yes      Resolved Hospital Problems   No resolved problems to display.       MEDICAL DECISION MAKING COMPLEXITY BY PROBLEM:        Acute 3rd nerve palsy left side.  Onset more than 24 hours prior to admission without change.  Etiology is not clear could be related to myasthenia gravis versus vasculitic process versus others  MRI brain negative  CT angiography pending  Dr. Johnson has seen the patient already  Mestinon is being tried for possible myasthenia gravis  Further work-up deferred to Dr. Smyth  May need  ophthalmology evaluation as well           Cough and low-grade fever, suspected upper respiratory infection/bronchitis about a week duration.  Negative chest x-ray  Pending blood culture  Negative HIV  Negative urinalysis  Positive respiratory panel for influenza A.  Patient was given for therapy given remote onset  We will continue supportive care  CT chest showing spiculated lesion left upper lobe and pulmonary consulted        Depression/anxiety on multiple medications  History of alcohol dependence on disulfiram            Code Status -   Code Status and Medical Interventions:   Ordered at: 01/31/20 170     Level Of Support Discussed With:    Patient     Code Status:    CPR     Medical Interventions (Level of Support Prior to Arrest):    Full       Discharge Planning          Destination      Coordination has not been started for this encounter.      Durable Medical Equipment      Coordination has not been started for this encounter.      Dialysis/Infusion      Coordination has not been started for this encounter.      Home Medical Care      Coordination has not been started for this encounter.      Therapy      Coordination has not been started for this encounter.      Community Resources      Coordination has not been started for this encounter.            Electronically signed by Kermit Meléndez MD, 02/02/20, 11:56 AM.  [facility] Hospitalist Team

## 2020-02-02 NOTE — PLAN OF CARE
Problem: Stroke (Ischemic) (Adult)  Goal: Signs and Symptoms of Listed Potential Problems Will be Absent, Minimized or Managed (Stroke)  Outcome: Ongoing (interventions implemented as appropriate)

## 2020-02-02 NOTE — PROGRESS NOTES
Cc:   Left ptosis.        S:  Patient states that his eye is doing better.      O:  Vitals stable.  CT Scan of Chest 9 mm sp;iculated nodule in anterior medial lung apex.  O/E The patient is awake, alert, follow commands.  CN Left eye ptosis is noted. No facial droop. Motor 5/5.    A:  Left ptosis may be secondary to apical left lung tumor.      P:  Will continue Mestinon at present.  Work up in progress.  Will follow.

## 2020-02-03 LAB — B BURGDOR IGG SER QL: NEGATIVE

## 2020-02-05 LAB
A PHAGOCYTOPH IGM TITR SER IF: NEGATIVE {TITER}
BACTERIA SPEC AEROBE CULT: NORMAL
CONV HGE IGG TITER: NEGATIVE
E CHAFFEENSIS IGG TITR SER IF: NEGATIVE {TITER}
E. CHAFFEENSIS (HME) IGM TITER: NEGATIVE

## 2020-02-06 LAB
BRUCELLA IGG SER QL IA: NEGATIVE
BRUCELLA IGM SER QL IA: NEGATIVE
RICK SF IGG TITR SER IF: NORMAL {TITER}
RICK SF IGM TITR SER IF: NORMAL {TITER}
TYPHUS FEVER GROUP IGG: NORMAL
TYPHUS FEVER GROUP IGM: NORMAL

## 2020-02-07 LAB
ACHR AB SER-SCNC: 0.05 NMOL/L (ref 0–0.24)
ACHR BLOCK AB/ACHR TOTAL SFR SER: 24 % (ref 0–25)
ACHR MOD AB/ACHR TOTAL SFR SER: <12 % (ref 0–20)

## 2020-02-14 ENCOUNTER — DOCUMENTATION (OUTPATIENT)
Dept: ONCOLOGY | Facility: CLINIC | Age: 60
End: 2020-02-14

## 2020-02-14 ENCOUNTER — TELEPHONE (OUTPATIENT)
Dept: ONCOLOGY | Facility: CLINIC | Age: 60
End: 2020-02-14

## 2020-02-14 DIAGNOSIS — R91.1 LESION OF LEFT LUNG: Primary | ICD-10-CM

## 2020-02-14 NOTE — TELEPHONE ENCOUNTER
Called patient and left him a message to call us and make a follow up appt and that someone will call him with an appt with Dr. Wright.

## 2020-03-17 ENCOUNTER — TRANSCRIBE ORDERS (OUTPATIENT)
Dept: ADMINISTRATIVE | Facility: HOSPITAL | Age: 60
End: 2020-03-17

## 2020-03-17 DIAGNOSIS — R91.1 LUNG NODULE: Primary | ICD-10-CM
